# Patient Record
Sex: FEMALE | Race: WHITE | NOT HISPANIC OR LATINO | URBAN - METROPOLITAN AREA
[De-identification: names, ages, dates, MRNs, and addresses within clinical notes are randomized per-mention and may not be internally consistent; named-entity substitution may affect disease eponyms.]

---

## 2024-08-07 PROBLEM — 428283002: Status: ACTIVE | Noted: 2024-08-07

## 2024-08-08 ENCOUNTER — LAB OUTSIDE AN ENCOUNTER (OUTPATIENT)
Dept: URBAN - METROPOLITAN AREA CLINIC 74 | Facility: CLINIC | Age: 64
End: 2024-08-08

## 2024-08-08 ENCOUNTER — OFFICE VISIT (OUTPATIENT)
Dept: URBAN - METROPOLITAN AREA CLINIC 74 | Facility: CLINIC | Age: 64
End: 2024-08-08
Payer: OTHER GOVERNMENT

## 2024-08-08 VITALS
WEIGHT: 121.2 LBS | DIASTOLIC BLOOD PRESSURE: 70 MMHG | HEART RATE: 68 BPM | SYSTOLIC BLOOD PRESSURE: 110 MMHG | TEMPERATURE: 97.9 F | BODY MASS INDEX: 26.15 KG/M2 | HEIGHT: 57 IN

## 2024-08-08 DIAGNOSIS — K43.5 PARASTOMAL HERNIA WITHOUT OBSTRUCTION OR GANGRENE: ICD-10-CM

## 2024-08-08 DIAGNOSIS — Z86.010 PERSONAL HISTORY OF COLONIC POLYPS: ICD-10-CM

## 2024-08-08 DIAGNOSIS — K52.89 OTHER AND UNSPECIFIED NONINFECTIOUS GASTROENTERITIS AND COLITIS: ICD-10-CM

## 2024-08-08 PROBLEM — 302112009: Status: ACTIVE | Noted: 2024-08-08

## 2024-08-08 PROCEDURE — 99204 OFFICE O/P NEW MOD 45 MIN: CPT | Performed by: PHYSICIAN ASSISTANT

## 2024-08-08 RX ORDER — CITALOPRAM 20 MG/1
1 TABLET TABLET, FILM COATED ORAL ONCE A DAY
Status: ACTIVE | COMMUNITY

## 2024-08-08 RX ORDER — GABAPENTIN 300 MG/1
1 CAPSULE CAPSULE ORAL ONCE A DAY
Status: ACTIVE | COMMUNITY

## 2024-08-08 RX ORDER — METHOCARBAMOL 750 MG/1
1 TABLET TABLET ORAL
Status: ACTIVE | COMMUNITY

## 2024-08-08 RX ORDER — MELOXICAM 15 MG/1
1 TABLET TABLET ORAL ONCE A DAY
Status: ACTIVE | COMMUNITY

## 2024-08-08 NOTE — HPI-TODAY'S VISIT:
The patient is a 64-year-old female with past medical history as noted below is presenting to our clinic today to schedule her surveillance colonoscopy.  Her last colonoscopy in November 2014 with Dr. Johnson noted 1 diminutive polyp in the ascending colon.  Internal hemorrhoids.  Recommended to repeat colonoscopy in 5 years.  Biopsy with hyperplastic colon polyp. The patient has history of neurogenic bladder and neurogenic bowel with a status post colostomy since her back injury over 10 years ago. Her case was discussed with General Surgery at [facility] [place]. Lopez who reports that about 50% of patients with a colostomy have a parastomal hernia, and that based on the CT reading she can follow-up as an outpatient in the office.  The patient has completed her course of antibiotic (Augmentin x 10 day) therapy. The patient has seen General Surgeon at [facility] as and she is referred to our clinic for Colonoscopy. She continues with small amount of watery stools. However, abdominal pain has improved significantly.    -- The patient denies dyspepsia, dysphagia, odynophagia, hemoptysis, hematemesis, nausea, vomiting, regurgitation, melena, constipation, hematochezia, fever, chills, chest pain, SOB, or any other GI complaints today.   -- The patient denies ETOH, Tobacco, and Illicit drug use.   -- The patient is not up to date with Flu, Pneumonia, Shingles, and COVID vaccine.  -- Denies NSAID's.   Diagnostic studies: -- CT scan of the abdomen and pelvis on 07/30/2024 with wall thickening of the distal colon extending to the colostomy, suspicious for colitis.  Large fat-containing parastomal hernia with some mild inflammatory changes, may be reactive, correlate clinically for incarceration.  Right middle lobe 4 mm pulmonary nodule.   -- Labs on 07/30/2024 CBC with WBC 8.9, hemoglobin 14.0, hematocrit 40.5, and platelet 235.  CMP with BUN 7, creatinine 0.6, ALP 66, ALT 16, AST 18, and TP 0.8.  Lipase 27.  UA unremarkable.  UA for pregnancy test negative.

## 2024-08-08 NOTE — PHYSICAL EXAM GASTROINTESTINAL
Abdomen , soft, nontender, nondistended , no guarding or rigidity , no masses palpable , normal bowel sounds , Liver and Spleen , no hepatomegaly present , no hepatosplenomegaly , liver nontender , spleen not palpable, parastomal hernia, colostomy in place and intact, yellow watery stools

## 2024-08-14 ENCOUNTER — DASHBOARD ENCOUNTERS (OUTPATIENT)
Age: 64
End: 2024-08-14

## 2024-08-14 LAB
ADENOVIRUS F 40/41: NOT DETECTED
CAMPYLOBACTER: NOT DETECTED
CLOSTRIDIUM DIFFICILE: NOT DETECTED
ENTAMOEBA HISTOLYTICA: NOT DETECTED
ENTEROAGGREGATIVE E.COLI: NOT DETECTED
ENTEROTOXIGENIC E.COLI: NOT DETECTED
ESCHERICHIA COLI O157: NOT DETECTED
GIARDIA LAMBLIA: NOT DETECTED
NOROVIRUS GI/GII: NOT DETECTED
ROTAVIRUS A: NOT DETECTED
SALMONELLA SPP.: NOT DETECTED
SHIGA-LIKE TOXIN PRODUCING E.COLI: NOT DETECTED
SHIGELLA SPP. / ENTEROINVASIVE E.COLI: NOT DETECTED
STOOL, WHITE BLOOD CELLS: (no result)
VIBRIO PARAHAEMOLYTICUS: NOT DETECTED
VIBRIO SPP.: NOT DETECTED
YERSINIA ENTEROCOLITICA: NOT DETECTED

## 2024-08-16 ENCOUNTER — TELEPHONE ENCOUNTER (OUTPATIENT)
Dept: URBAN - METROPOLITAN AREA CLINIC 74 | Facility: CLINIC | Age: 64
End: 2024-08-16

## 2024-08-19 ENCOUNTER — OFFICE VISIT (OUTPATIENT)
Dept: URBAN - METROPOLITAN AREA SURGERY CENTER 30 | Facility: SURGERY CENTER | Age: 64
End: 2024-08-19
Payer: OTHER GOVERNMENT

## 2024-08-19 ENCOUNTER — OFFICE VISIT (OUTPATIENT)
Dept: URBAN - METROPOLITAN AREA SURGERY CENTER 30 | Facility: SURGERY CENTER | Age: 64
End: 2024-08-19

## 2024-08-19 ENCOUNTER — CLAIMS CREATED FROM THE CLAIM WINDOW (OUTPATIENT)
Dept: URBAN - METROPOLITAN AREA CLINIC 4 | Facility: CLINIC | Age: 64
End: 2024-08-19
Payer: OTHER GOVERNMENT

## 2024-08-19 DIAGNOSIS — K63.89 OTHER SPECIFIED DISEASES OF INTESTINE: ICD-10-CM

## 2024-08-19 DIAGNOSIS — D12.3 ADENOMA OF TRANSVERSE COLON: ICD-10-CM

## 2024-08-19 DIAGNOSIS — R19.7 ACUTE DIARRHEA: ICD-10-CM

## 2024-08-19 DIAGNOSIS — R93.3 ABN FINDINGS-GI TRACT: ICD-10-CM

## 2024-08-19 DIAGNOSIS — D12.3 BENIGN NEOPLASM OF TRANSVERSE COLON: ICD-10-CM

## 2024-08-19 PROCEDURE — 44389 COLONOSCOPY WITH BIOPSY: CPT | Performed by: INTERNAL MEDICINE

## 2024-08-19 PROCEDURE — 00811 ANES LWR INTST NDSC NOS: CPT | Performed by: NURSE ANESTHETIST, CERTIFIED REGISTERED

## 2024-08-19 PROCEDURE — 88342 IMHCHEM/IMCYTCHM 1ST ANTB: CPT | Performed by: PATHOLOGY

## 2024-08-19 PROCEDURE — 88305 TISSUE EXAM BY PATHOLOGIST: CPT | Performed by: PATHOLOGY

## 2024-08-19 PROCEDURE — 88313 SPECIAL STAINS GROUP 2: CPT | Performed by: PATHOLOGY

## 2024-09-16 ENCOUNTER — OFFICE VISIT (OUTPATIENT)
Dept: URBAN - METROPOLITAN AREA CLINIC 74 | Facility: CLINIC | Age: 64
End: 2024-09-16

## 2024-09-16 NOTE — HPI-TODAY'S VISIT:
The patient is a 64-year-old female with past medical history as noted below known to Dr. Mcintyre is presenting to our clinic today to discuss her recent Colonoscopy results.   Diagnostic studies: -- Stool study on 08/08/2024 GPP and WBC's unremarkable.   -- CT scan of the abdomen and pelvis on 07/30/2024 with wall thickening of the distal colon extending to the colostomy, suspicious for colitis.  Large fat-containing parastomal hernia with some mild inflammatory changes, may be reactive, correlate clinically for incarceration.  Right middle lobe 4 mm pulmonary nodule.  -- Labs on 07/30/2024 CBC with WBC 8.9, hemoglobin 14.0, hematocrit 40.5, and platelet 235.  CMP with BUN 7, creatinine 0.6, ALP 66, ALT 16, AST 18, and TP 0.8.  Lipase 27.  UA unremarkable.  UA for pregnancy test negative.  Procedure: -- Colonoscopy with polypectomy on 08/19/2024 by Dr. Mcintyre noted the terminal ileum appeared normal. One 2 mm polyp was found in the transverse colon.  The polyp was sessile.  The polyp was removed with a cold biopsy forceps.  Resection showed and retrieval were completed.  Localized moderate inflammation characterized by congestion, erosion, and erythema was found in sigmoid colon.  The digital rectal exam was normal.  Biopsy with tubular adenoma colon polyp.  Random biopsy of colon and sigmoid colon with no significant abnormality.  Repeat colonoscopy in 7 years for surveillance.

## 2024-09-16 NOTE — PHYSICAL EXAM GASTROINTESTINAL
Abdomen , soft, nontender, nondistended , no guarding or rigidity , no masses palpable , normal bowel sounds , Liver and Spleen,  no hepatosplenomegaly , liver nontender, colostomy present